# Patient Record
Sex: FEMALE
[De-identification: names, ages, dates, MRNs, and addresses within clinical notes are randomized per-mention and may not be internally consistent; named-entity substitution may affect disease eponyms.]

---

## 2019-12-05 ENCOUNTER — HOSPITAL ENCOUNTER (OUTPATIENT)
Dept: HOSPITAL 53 - M LAB REF | Age: 34
End: 2019-12-05
Attending: INTERNAL MEDICINE
Payer: COMMERCIAL

## 2019-12-05 DIAGNOSIS — Z85.850: Primary | ICD-10-CM

## 2019-12-05 DIAGNOSIS — E04.1: ICD-10-CM

## 2020-01-10 ENCOUNTER — HOSPITAL ENCOUNTER (OUTPATIENT)
Dept: HOSPITAL 53 - M RAD | Age: 35
End: 2020-01-10
Attending: NURSE PRACTITIONER
Payer: COMMERCIAL

## 2020-01-10 DIAGNOSIS — R59.9: Primary | ICD-10-CM

## 2020-01-10 DIAGNOSIS — C73: ICD-10-CM

## 2020-01-10 PROCEDURE — 70491 CT SOFT TISSUE NECK W/DYE: CPT

## 2020-01-10 NOTE — REP
INDICATION:  Malignancy of the thyroid gland

 

PROCEDURE:  CT neck with contrast

 

COMPARISON STUDIES: No prior similar studies

 

FINDINGS:  The thyroid glands are large bilaterally and heterogeneous in

appearance, but significantly greater on the left.  The left thyroid has a

irregular and nodular appearance extends to the level of the bifurcation of the

subclavian vessels just above the aortic arch.  There are a number of pathologic

appearing lymph nodes in the posterior triangle on the left extending to the

skull base.

 

On the right there are diffuse irregular appearing pathologic lymph nodes

inferiorly in the posterior triangle of the neck.  The left thyroid is slightly

enlarged, but heterogeneous and abnormal appearance.  Abnormal lymph nodes extend

to the supraclavicular region.

 

The dominant irregular appearing left thyroid lobe measures approximately 4.5

centimeters transverse by 5.6 cm craniocaudad by 5 cm in AP dimension.

 

The visualized lung apices are clear.

 

Paranasal sinuses and mastoid air cells are clear.

 

There is a sclerotic appearance to the left mandible which is otherwise intact.

 

 

IMPRESSION:

 

   1. Thyroid neoplasm with enlargement of the left lobe as described also

   abnormal appearance to the right thyroid gland.

   2. Extensive pathologic lymphadenopathy in the posterior triangles

   bilaterally, greater on the left.

   3. Discrete sclerotic appearance to the left mandible, given the thyroid

   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

 

 

 

Electronically Signed by

Mic Evans MD 01/10/2020 03:09 P

## 2020-03-10 ENCOUNTER — HOSPITAL ENCOUNTER (OUTPATIENT)
Dept: HOSPITAL 53 - M LABDRAW1 | Age: 35
End: 2020-03-10
Attending: INTERNAL MEDICINE
Payer: COMMERCIAL

## 2020-03-10 DIAGNOSIS — C73: Primary | ICD-10-CM

## 2020-03-10 LAB
T4 FREE SERPL-MCNC: 1.86 NG/DL (ref 0.76–1.46)
TSH SERPL DL<=0.005 MIU/L-ACNC: 0.08 UIU/ML (ref 0.36–3.74)

## 2020-03-11 LAB — THYROGLOB AB SERPL-ACNC: 15.6 U/ML (ref ?–60)

## 2020-03-12 LAB
THYROGLOB AB SERPL-ACNC: < 1 IU/ML (ref 0–0.9)
THYROGLOB SERPL-MCNC: 37 NG/ML (ref 1.5–38.5)

## 2020-06-10 ENCOUNTER — HOSPITAL ENCOUNTER (OUTPATIENT)
Dept: HOSPITAL 53 - M LRY | Age: 35
End: 2020-06-10
Attending: INTERNAL MEDICINE
Payer: COMMERCIAL

## 2020-06-10 DIAGNOSIS — C73: Primary | ICD-10-CM

## 2020-06-10 LAB
T4 FREE SERPL-MCNC: 1.71 NG/DL (ref 0.76–1.46)
THYROGLOB AB SERPL-ACNC: < 15 U/ML (ref ?–60)
TSH SERPL DL<=0.005 MIU/L-ACNC: 0.01 UIU/ML (ref 0.36–3.74)

## 2020-06-12 LAB
THYROGLOB AB SERPL-ACNC: < 1 IU/ML (ref 0–0.9)
THYROGLOB SERPL-MCNC: 18.3 NG/ML (ref 1.5–38.5)

## 2020-09-17 ENCOUNTER — HOSPITAL ENCOUNTER (OUTPATIENT)
Dept: HOSPITAL 53 - M WUC | Age: 35
End: 2020-09-17
Attending: NURSE PRACTITIONER
Payer: COMMERCIAL

## 2020-09-17 DIAGNOSIS — E89.0: Primary | ICD-10-CM

## 2020-09-17 DIAGNOSIS — C73: ICD-10-CM

## 2020-09-17 LAB
T4 FREE SERPL-MCNC: 1.74 NG/DL (ref 0.76–1.46)
THYROGLOB AB SERPL-ACNC: 17.4 U/ML (ref ?–60)
TSH SERPL DL<=0.005 MIU/L-ACNC: 0.02 UIU/ML (ref 0.36–3.74)

## 2020-09-19 LAB
THYROGLOB AB SERPL-ACNC: < 1 IU/ML (ref 0–0.9)
THYROGLOB SERPL-MCNC: 16.7 NG/ML (ref 1.5–38.5)

## 2020-12-19 ENCOUNTER — HOSPITAL ENCOUNTER (OUTPATIENT)
Dept: HOSPITAL 53 - M WUC | Age: 35
End: 2020-12-19
Attending: INTERNAL MEDICINE
Payer: COMMERCIAL

## 2020-12-19 DIAGNOSIS — E89.0: ICD-10-CM

## 2020-12-19 DIAGNOSIS — C73: Primary | ICD-10-CM

## 2020-12-19 LAB
T4 FREE SERPL-MCNC: 1.42 NG/DL (ref 0.76–1.46)
TSH SERPL DL<=0.005 MIU/L-ACNC: 0.04 UIU/ML (ref 0.36–3.74)

## 2020-12-21 LAB — THYROGLOB AB SERPL-ACNC: < 15 U/ML (ref ?–60)

## 2020-12-22 LAB
THYROGLOB AB SERPL-ACNC: < 1 IU/ML (ref 0–0.9)
THYROGLOB SERPL-MCNC: 18.7 NG/ML (ref 1.5–38.5)